# Patient Record
Sex: FEMALE | Race: WHITE | URBAN - NONMETROPOLITAN AREA
[De-identification: names, ages, dates, MRNs, and addresses within clinical notes are randomized per-mention and may not be internally consistent; named-entity substitution may affect disease eponyms.]

---

## 2017-01-02 ENCOUNTER — OFFICE VISIT - GICH (OUTPATIENT)
Dept: FAMILY MEDICINE | Facility: OTHER | Age: 57
End: 2017-01-02

## 2017-01-02 ENCOUNTER — HISTORY (OUTPATIENT)
Dept: FAMILY MEDICINE | Facility: OTHER | Age: 57
End: 2017-01-02

## 2017-01-02 DIAGNOSIS — F90.0 ATTENTION-DEFICIT HYPERACTIVITY DISORDER, PREDOMINANTLY INATTENTIVE TYPE: ICD-10-CM

## 2017-01-02 DIAGNOSIS — Z79.899 OTHER LONG TERM (CURRENT) DRUG THERAPY: ICD-10-CM

## 2017-01-02 ASSESSMENT — PATIENT HEALTH QUESTIONNAIRE - PHQ9: SUM OF ALL RESPONSES TO PHQ QUESTIONS 1-9: 0

## 2017-01-09 LAB
6-MONOACETYL MORPHINE - HISTORICAL: ABNORMAL NG/ML
AMPHETAMINE URINE - HISTORICAL: ABNORMAL NG/ML
BARBITURATE URINE - HISTORICAL: ABNORMAL NG/ML
BENZODIAZEPINE URINE - HISTORICAL: ABNORMAL NG/ML
BUPRENORPHRINE URINE - HISTORICAL: ABNORMAL NG/ML
COCAINE METAB URINE - HISTORICAL: ABNORMAL NG/ML
CREAT UR - HISTORICAL: 102 MG/DL
ETHYLGLUCURONIDE URINE - HISTORICAL: ABNORMAL NG/ML
FENTANYL URINE - HISTORICAL: ABNORMAL NG/ML
MASS SPECTROMETRY URINE - HISTORICAL: ABNORMAL
METHADONE URINE - HISTORICAL: ABNORMAL NG/ML
OPIATES URINE - HISTORICAL: ABNORMAL NG/ML
OXYCODONE URINE - HISTORICAL: ABNORMAL NG/ML
PH URINE - HISTORICAL: 5.7
PROPOXYPHENE URINE - HISTORICAL: ABNORMAL NG/ML
THC 50 URINE - HISTORICAL: ABNORMAL NG/ML
TRAMADOL - HISTORICAL: ABNORMAL NG/ML

## 2017-01-23 ENCOUNTER — COMMUNICATION - GICH (OUTPATIENT)
Dept: FAMILY MEDICINE | Facility: OTHER | Age: 57
End: 2017-01-23

## 2017-01-23 DIAGNOSIS — J30.0 VASOMOTOR RHINITIS: ICD-10-CM

## 2017-04-26 ENCOUNTER — AMBULATORY - GICH (OUTPATIENT)
Dept: FAMILY MEDICINE | Facility: OTHER | Age: 57
End: 2017-04-26

## 2017-04-26 DIAGNOSIS — M35.00 SICCA SYNDROME (H): ICD-10-CM

## 2017-04-26 DIAGNOSIS — I73.00 RAYNAUD'S SYNDROME WITHOUT GANGRENE: ICD-10-CM

## 2017-07-24 ENCOUNTER — AMBULATORY - GICH (OUTPATIENT)
Dept: SCHEDULING | Facility: OTHER | Age: 57
End: 2017-07-24

## 2017-07-24 ENCOUNTER — OFFICE VISIT - GICH (OUTPATIENT)
Dept: FAMILY MEDICINE | Facility: OTHER | Age: 57
End: 2017-07-24

## 2017-07-24 ENCOUNTER — HISTORY (OUTPATIENT)
Dept: FAMILY MEDICINE | Facility: OTHER | Age: 57
End: 2017-07-24

## 2017-07-24 DIAGNOSIS — F09 MENTAL DISORDER DUE TO KNOWN PHYSIOLOGICAL CONDITION: ICD-10-CM

## 2017-07-24 DIAGNOSIS — F90.0 ATTENTION-DEFICIT HYPERACTIVITY DISORDER, PREDOMINANTLY INATTENTIVE TYPE: ICD-10-CM

## 2017-07-24 DIAGNOSIS — K11.6 MUCOCELE OF SALIVARY GLAND: ICD-10-CM

## 2017-07-24 DIAGNOSIS — H40.009 PREGLAUCOMA: ICD-10-CM

## 2017-07-24 DIAGNOSIS — D11.9 BENIGN NEOPLASM OF MAJOR SALIVARY GLAND: ICD-10-CM

## 2017-07-24 DIAGNOSIS — Z79.899 OTHER LONG TERM (CURRENT) DRUG THERAPY: ICD-10-CM

## 2017-07-24 DIAGNOSIS — L71.9 ROSACEA: ICD-10-CM

## 2017-07-24 DIAGNOSIS — B35.1 TINEA UNGUIUM: ICD-10-CM

## 2017-07-24 DIAGNOSIS — M35.00 SICCA SYNDROME (H): ICD-10-CM

## 2017-08-01 LAB
6-MONOACETYL MORPHINE - HISTORICAL: ABNORMAL NG/ML
AMPHETAMINE URINE - HISTORICAL: ABNORMAL NG/ML
BARBITURATE URINE - HISTORICAL: ABNORMAL NG/ML
BENZODIAZEPINE URINE - HISTORICAL: ABNORMAL NG/ML
BUPRENORPHRINE URINE - HISTORICAL: ABNORMAL NG/ML
COCAINE METAB URINE - HISTORICAL: ABNORMAL NG/ML
CREAT UR - HISTORICAL: 32 MG/DL
ETHYLGLUCURONIDE URINE - HISTORICAL: ABNORMAL NG/ML
FENTANYL URINE - HISTORICAL: ABNORMAL NG/ML
MASS SPECTROMETRY URINE - HISTORICAL: ABNORMAL
METHADONE URINE - HISTORICAL: ABNORMAL NG/ML
OPIATES URINE - HISTORICAL: ABNORMAL NG/ML
OXYCODONE URINE - HISTORICAL: ABNORMAL NG/ML
PH URINE - HISTORICAL: 7.3
PROPOXYPHENE URINE - HISTORICAL: ABNORMAL NG/ML
THC 50 URINE - HISTORICAL: ABNORMAL NG/ML
TRAMADOL - HISTORICAL: ABNORMAL NG/ML
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
## 2018-01-25 ENCOUNTER — DOCUMENTATION ONLY (OUTPATIENT)
Dept: FAMILY MEDICINE | Facility: OTHER | Age: 58
End: 2018-01-25

## 2018-01-25 PROBLEM — B35.1 ONYCHOMYCOSIS: Status: ACTIVE | Noted: 2017-07-24

## 2018-01-25 PROBLEM — H40.009 GLAUCOMA SUSPECT: Status: ACTIVE | Noted: 2017-07-24

## 2018-01-25 RX ORDER — METRONIDAZOLE 7.5 MG/G
LOTION TOPICAL
COMMUNITY
Start: 2017-07-24

## 2018-01-25 RX ORDER — FOLIC ACID 0.8 MG
1 TABLET ORAL DAILY
COMMUNITY
Start: 2016-06-15

## 2018-01-25 RX ORDER — NAPROXEN SODIUM 220 MG
1 TABLET ORAL 2 TIMES DAILY WITH MEALS
COMMUNITY
Start: 2015-08-04

## 2018-01-25 RX ORDER — DEXTROAMPHETAMINE SACCHARATE, AMPHETAMINE ASPARTATE, DEXTROAMPHETAMINE SULFATE AND AMPHETAMINE SULFATE 5; 5; 5; 5 MG/1; MG/1; MG/1; MG/1
1 TABLET ORAL 2 TIMES DAILY
COMMUNITY
Start: 2017-01-02

## 2018-01-25 RX ORDER — DEXTROAMPHETAMINE SACCHARATE, AMPHETAMINE ASPARTATE, DEXTROAMPHETAMINE SULFATE AND AMPHETAMINE SULFATE 5; 5; 5; 5 MG/1; MG/1; MG/1; MG/1
1 TABLET ORAL 2 TIMES DAILY
COMMUNITY
Start: 2017-09-22

## 2018-01-25 RX ORDER — CICLOPIROX 80 MG/ML
SOLUTION TOPICAL
COMMUNITY
Start: 2017-07-24

## 2018-01-25 RX ORDER — HYDROXYCHLOROQUINE SULFATE 200 MG/1
2 TABLET, FILM COATED ORAL DAILY
COMMUNITY
Start: 2013-06-22

## 2018-01-25 RX ORDER — TRAVOPROST OPHTHALMIC SOLUTION 0.04 MG/ML
SOLUTION OPHTHALMIC
COMMUNITY
Start: 2016-05-01

## 2018-01-27 VITALS
BODY MASS INDEX: 26.98 KG/M2 | HEIGHT: 68 IN | DIASTOLIC BLOOD PRESSURE: 74 MMHG | WEIGHT: 178 LBS | SYSTOLIC BLOOD PRESSURE: 124 MMHG | HEART RATE: 84 BPM

## 2018-01-27 VITALS
SYSTOLIC BLOOD PRESSURE: 124 MMHG | BODY MASS INDEX: 26.73 KG/M2 | DIASTOLIC BLOOD PRESSURE: 76 MMHG | WEIGHT: 173.2 LBS | HEART RATE: 72 BPM

## 2018-02-03 ASSESSMENT — PATIENT HEALTH QUESTIONNAIRE - PHQ9: SUM OF ALL RESPONSES TO PHQ QUESTIONS 1-9: 0

## 2018-04-09 ENCOUNTER — HEALTH MAINTENANCE LETTER (OUTPATIENT)
Age: 58
End: 2018-04-09

## 2018-07-03 ENCOUNTER — HEALTH MAINTENANCE LETTER (OUTPATIENT)
Age: 58
End: 2018-07-03

## 2020-03-11 ENCOUNTER — HEALTH MAINTENANCE LETTER (OUTPATIENT)
Age: 60
End: 2020-03-11

## 2020-12-27 ENCOUNTER — HEALTH MAINTENANCE LETTER (OUTPATIENT)
Age: 60
End: 2020-12-27

## 2021-04-25 ENCOUNTER — HEALTH MAINTENANCE LETTER (OUTPATIENT)
Age: 61
End: 2021-04-25

## 2021-10-09 ENCOUNTER — HEALTH MAINTENANCE LETTER (OUTPATIENT)
Age: 61
End: 2021-10-09

## 2022-03-26 ENCOUNTER — HEALTH MAINTENANCE LETTER (OUTPATIENT)
Age: 62
End: 2022-03-26

## 2022-05-21 ENCOUNTER — HEALTH MAINTENANCE LETTER (OUTPATIENT)
Age: 62
End: 2022-05-21

## 2022-09-17 ENCOUNTER — HEALTH MAINTENANCE LETTER (OUTPATIENT)
Age: 62
End: 2022-09-17

## 2023-06-04 ENCOUNTER — HEALTH MAINTENANCE LETTER (OUTPATIENT)
Age: 63
End: 2023-06-04